# Patient Record
Sex: MALE | Race: BLACK OR AFRICAN AMERICAN | Employment: OTHER | URBAN - METROPOLITAN AREA
[De-identification: names, ages, dates, MRNs, and addresses within clinical notes are randomized per-mention and may not be internally consistent; named-entity substitution may affect disease eponyms.]

---

## 2023-05-16 ENCOUNTER — HOSPITAL ENCOUNTER (OUTPATIENT)
Age: 68
Discharge: HOME | End: 2023-05-16
Payer: MEDICARE

## 2023-05-16 VITALS
WEIGHT: 220 LBS | RESPIRATION RATE: 18 BRPM | TEMPERATURE: 99 F | SYSTOLIC BLOOD PRESSURE: 159 MMHG | DIASTOLIC BLOOD PRESSURE: 92 MMHG | HEART RATE: 82 BPM | OXYGEN SATURATION: 98 % | BODY MASS INDEX: 30.8 KG/M2 | HEIGHT: 71 IN

## 2023-05-16 DIAGNOSIS — R19.5 LOOSE STOOLS: ICD-10-CM

## 2023-05-16 DIAGNOSIS — R68.89 FLU-LIKE SYMPTOMS: Primary | ICD-10-CM

## 2023-05-16 LAB — SARS-COV-2 RNA RESP QL NAA+PROBE: NOT DETECTED

## 2023-05-16 PROCEDURE — 99202 OFFICE O/P NEW SF 15 MIN: CPT

## 2023-05-16 PROCEDURE — 99203 OFFICE O/P NEW LOW 30 MIN: CPT

## 2023-05-16 RX ORDER — ATORVASTATIN CALCIUM 10 MG/1
10 TABLET, FILM COATED ORAL NIGHTLY
COMMUNITY

## 2023-05-16 RX ORDER — COLCHICINE 0.6 MG/1
0.6 TABLET ORAL DAILY
COMMUNITY

## 2023-05-16 NOTE — DISCHARGE INSTRUCTIONS
Please return to the ER/clinic if symptoms worsen. Follow-up with your PCP in 24-48 hours as needed. Push fluids. Take Motrin and/or Tylenol for discomfort. Recommended BRAT dietary plan. Your wife being positive and in close proximity and you with similar symptoms consider yourself positive also. If symptoms persist or worsen i.e. increasing diarrhea or fever shortness of breath etc. go directly to the emergency room. Otherwise follow-up with your primary care physician for further evaluation and treatment.